# Patient Record
Sex: MALE | Race: ASIAN | NOT HISPANIC OR LATINO | Employment: FULL TIME | URBAN - METROPOLITAN AREA
[De-identification: names, ages, dates, MRNs, and addresses within clinical notes are randomized per-mention and may not be internally consistent; named-entity substitution may affect disease eponyms.]

---

## 2017-04-07 ENCOUNTER — AMBULATORY - HEALTHEAST (OUTPATIENT)
Dept: LAB | Facility: HOSPITAL | Age: 27
End: 2017-04-07

## 2017-04-07 DIAGNOSIS — R10.13 ABDOMINAL PAIN, EPIGASTRIC: ICD-10-CM

## 2017-04-07 DIAGNOSIS — R07.9 CHEST PAIN, UNSPECIFIED: ICD-10-CM

## 2023-04-07 ENCOUNTER — HOSPITAL ENCOUNTER (EMERGENCY)
Facility: HOSPITAL | Age: 33
Discharge: HOME | End: 2023-04-07
Attending: EMERGENCY MEDICINE | Admitting: EMERGENCY MEDICINE
Payer: COMMERCIAL

## 2023-04-07 ENCOUNTER — APPOINTMENT (OUTPATIENT)
Dept: CT IMAGING | Facility: HOSPITAL | Age: 33
End: 2023-04-07
Attending: EMERGENCY MEDICINE
Payer: COMMERCIAL

## 2023-04-07 VITALS
BODY MASS INDEX: 28.85 KG/M2 | TEMPERATURE: 99 F | WEIGHT: 169 LBS | HEART RATE: 56 BPM | SYSTOLIC BLOOD PRESSURE: 105 MMHG | RESPIRATION RATE: 22 BRPM | DIASTOLIC BLOOD PRESSURE: 58 MMHG | HEIGHT: 64 IN | OXYGEN SATURATION: 97 %

## 2023-04-07 DIAGNOSIS — M50.20 HERNIATED DISC, CERVICAL: ICD-10-CM

## 2023-04-07 DIAGNOSIS — M54.9 BACK PAIN: Primary | ICD-10-CM

## 2023-04-07 PROCEDURE — 99285 EMERGENCY DEPT VISIT HI MDM: CPT | Mod: 25

## 2023-04-07 PROCEDURE — 250N000013 HC RX MED GY IP 250 OP 250 PS 637: Performed by: EMERGENCY MEDICINE

## 2023-04-07 PROCEDURE — 72131 CT LUMBAR SPINE W/O DYE: CPT

## 2023-04-07 PROCEDURE — 96372 THER/PROPH/DIAG INJ SC/IM: CPT | Performed by: EMERGENCY MEDICINE

## 2023-04-07 PROCEDURE — 250N000011 HC RX IP 250 OP 636: Performed by: EMERGENCY MEDICINE

## 2023-04-07 RX ORDER — ACETAMINOPHEN 325 MG/1
975 TABLET ORAL ONCE
Status: COMPLETED | OUTPATIENT
Start: 2023-04-07 | End: 2023-04-07

## 2023-04-07 RX ORDER — IBUPROFEN 200 MG
800 TABLET ORAL EVERY 4 HOURS PRN
Qty: 20 TABLET | Refills: 0 | Status: SHIPPED | OUTPATIENT
Start: 2023-04-07

## 2023-04-07 RX ORDER — HYDROCODONE BITARTRATE AND ACETAMINOPHEN 5; 325 MG/1; MG/1
1 TABLET ORAL EVERY 6 HOURS PRN
Qty: 10 TABLET | Refills: 0 | Status: SHIPPED | OUTPATIENT
Start: 2023-04-07 | End: 2023-04-10

## 2023-04-07 RX ORDER — KETOROLAC TROMETHAMINE 30 MG/ML
30 INJECTION, SOLUTION INTRAMUSCULAR; INTRAVENOUS ONCE
Status: COMPLETED | OUTPATIENT
Start: 2023-04-07 | End: 2023-04-07

## 2023-04-07 RX ORDER — OXYCODONE HYDROCHLORIDE 5 MG/1
5 TABLET ORAL ONCE
Status: COMPLETED | OUTPATIENT
Start: 2023-04-07 | End: 2023-04-07

## 2023-04-07 RX ADMIN — ACETAMINOPHEN 975 MG: 325 TABLET ORAL at 12:54

## 2023-04-07 RX ADMIN — KETOROLAC TROMETHAMINE 30 MG: 30 INJECTION, SOLUTION INTRAMUSCULAR at 12:54

## 2023-04-07 RX ADMIN — OXYCODONE HYDROCHLORIDE 5 MG: 5 TABLET ORAL at 12:54

## 2023-04-07 NOTE — ED TRIAGE NOTES
"Pt presents with 4 days of low back pain. This morning he tried to dress his baby and suddenly had sudden onset of more pain. Pt states he is unable to walk without help. Has not taken OTC from pain. Uncle gave him \"a Medicine that helps with the spine,\" but does not know what it was called.     Triage Assessment     Row Name 04/07/23 1120       Triage Assessment (Adult)    Airway WDL WDL       Respiratory WDL    Respiratory WDL WDL       Skin Circulation/Temperature WDL    Skin Circulation/Temperature WDL WDL       Cardiac WDL    Cardiac WDL WDL       Peripheral/Neurovascular WDL    Peripheral Neurovascular WDL WDL       Cognitive/Neuro/Behavioral WDL    Cognitive/Neuro/Behavioral WDL WDL              "

## 2023-04-07 NOTE — PROGRESS NOTES
Neurosurgery Treatment Plan:   Reviewed patients chart  33 year old male with complaint of worsening back pain x 3-4 days denies any radicular lower extremity pain numbness tingling or weakness. Per provider neurologically intact with good strength in all extremities      Images:   IMPRESSION:  1.  No fracture or posttraumatic subluxation.  2.  Broad-based right central disc protrusion at L5-S1 contributing to mild to moderate right lateral recess stenosis.     Plan:   Recommend treating medically. Start steroids can send home on medrol dospak  Outpatient physical therapy   [facility]      Lilia Villar PA-C  [facility]  O: [phone]

## 2023-04-07 NOTE — DISCHARGE INSTRUCTIONS
The CAT scan showed evidence of a herniated disc.  You have been given referral to the spine clinic if symptoms persist  Vicodin and Motrin have been prescribed for pain  Symptoms may last 6 weeks

## 2023-04-07 NOTE — ED PROVIDER NOTES
EMERGENCY DEPARTMENT ENCOUNTER        IMPRESSION:  Back pain - herniated disc without radiculopathy symptoms    MEDICAL DECISION MAKING:  It was my pleasure to provide care for Joss Mann who presented for evaluation of atraumatic low back pain.  No radicular symptoms    Patient is pleasant and cooperative    On exam vital signs his vital signs are normal.  He appears uncomfortable.  There is no lumbar spinal tenderness.  Lower extremity has full function.      Symptomatic pain medications were given with significant relief    CT of the lumbar spine imaging shows central disc protrusion L for L5.       Imaging independently reviewed and agree with radiologist findings.     Case discussed with on-call neurosurgery.  Patient has a normal neurologic exam.  No need for surgical intervention.  Patient to be referred to outpatient spine clinic        =================================================================  CHIEF COMPLAINT:  Chief Complaint   Patient presents with     Back Pain       HPI  Joss Mann is a 33 year old male with a history of GERD who presents to the ED by drop-off for evaluation of back pain.    Patient reports that he was lifting weights 4 days ago when he had a sudden onset of lower back pain. He notes that the pain has progressed since, and today, he is unable to ambulate independently.    Patient has not taken any OTC pain medications.     REVIEW OF SYSTEMS   Constitutional: Does not report chills, unintentional weight loss or fatigue   Eyes: Does not report visual changes or discharge    HENT: Does not report sore throat, ear pain or neck pain  Respiratory: Does not report cough or shortness of breath    Cardiovascular: Does not report chest pain, palpitations or leg swelling  GI: Does not report abdominal pain, nausea, vomiting, or dark, bloody stools.    : Does not report hematuria, dysuria, or flank pain  Musculoskeletal: Endorses back pain (lower, decrease movement secondary to weight  "lifting)  Skin: Does not report rash or wound  Neurologic: Does not report current headache, new weakness, focal weakness, or sensory changes      Remainder of systems reviewed, unless noted in HPI all others negative.    PAST MEDICAL HISTORY:  History reviewed. No pertinent past medical history.    PAST SURGICAL HISTORY:  History reviewed. No pertinent surgical history.    CURRENT MEDICATIONS:    HYDROcodone-acetaminophen (NORCO) 5-325 MG tablet  ibuprofen (ADVIL/MOTRIN) 200 MG tablet        ALLERGIES:  No Known Allergies    FAMILY HISTORY:  No family history on file.    SOCIAL HISTORY:   Social History     Socioeconomic History     Marital status:        PHYSICAL EXAM:    /58   Pulse 56   Temp 99  F (37.2  C) (Temporal)   Resp 22   Ht 1.626 m (5' 4\")   Wt 76.7 kg (169 lb)   SpO2 97%   BMI 29.01 kg/m      Constitutional: Awake, alert, in no acute distress  Respiratory: Respirations even, unlabored. Lungs clear to ascultation bilaterally, in no acute respiratory distress.  Cardiovascular: Regular rate and rhythm.+2 radial pulses, equal bilaterally.  No murmurs.   GI: Abdomen soft, non-tender to palpation in all 4 quadrants. No guarding or rebound. Bowel sounds intact on all 4 quadrants.   Back: No CVA tenderness.    Musculoskeletal: Moves all 4 extremities equally, strength symmetrical on bilateral uppers and lowers.  No peripheral edema  Integument: Warm, dry. No rash. No bruising or petechiae.  Lymphatic: No cervical lymphadenopathy  Neurologic: Alert & oriented x 3. Normal speech. Grossly normal motor and sensory function.   Psychiatric: Normal mood and affect. Normal judgement.    ED COURSE:    12:02 PM I met with patient for initial interview and encounter. We also discussed plan for treatment and diagnostic interventions.  1:31 PM Spoke to Lilia Villar PA-C, with neurosurgery regarding patient's plan for care.    Medical Decision Making    History:    Supplemental history from: " N/A    External Record(s) reviewed: External medical records including care everywhere reviewed    Work Up:    EKG laboratory and imaging studies independently reviewed by the provider    Broad differential diagnosis considered for back pain      External consultation:    Discussion of management with another provider: Lilia Villar PA-C (neurosurgery)    Complicating factors:    Patient has a complicated past medical history including N/A    Care affected by social determinants of health: Access to primary care    Disposition considerations: Disposition involved in shared decision-making with the patient, patient instructed to continue outpatient medication as prescribed, referred for follow-up, patient discharged with pain medication             LAB:  Laboratory results were independently reviewed and interpreted  Results for orders placed or performed during the hospital encounter of 04/07/23   Lumbar spine CT w/o contrast    Impression    IMPRESSION:  1.  No fracture or posttraumatic subluxation.  2.  Broad-based right central disc protrusion at L5-S1 contributing to mild to moderate right lateral recess stenosis.         RADIOLOGY:  Radiology reports were independently reviewed and interpreted  Lumbar spine CT w/o contrast   Final Result   IMPRESSION:   1.  No fracture or posttraumatic subluxation.   2.  Broad-based right central disc protrusion at L5-S1 contributing to mild to moderate right lateral recess stenosis.           MEDICATIONS GIVEN IN THE EMERGENCY:  Medications   ketorolac (TORADOL) injection 30 mg (30 mg Intramuscular $Given 4/7/23 1254)   acetaminophen (TYLENOL) tablet 975 mg (975 mg Oral $Given 4/7/23 1254)   oxyCODONE (ROXICODONE) tablet 5 mg (5 mg Oral $Given 4/7/23 1254)       NEW PRESCRIPTIONS STARTED AT TODAY'S ER VISIT:  New Prescriptions    HYDROCODONE-ACETAMINOPHEN (NORCO) 5-325 MG TABLET    Take 1 tablet by mouth every 6 hours as needed for severe pain    IBUPROFEN (ADVIL/MOTRIN) 200  MG TABLET    Take 4 tablets (800 mg) by mouth every 4 hours as needed for mild pain        Prior to making a final disposition on this patient the results of patient's tests and other diagnostic studies were discussed with the patient. All questions were answered. Patient expressed understanding of the plan and was amenable to it.    FINAL DIAGNOSIS:    ICD-10-CM    1. Herniated disc, cervical  M50.20              NAME: Joss Mann  AGE: 33 year old male  YOB: 1990  MRN: [id number]  EVALUATION DATE & TIME: No admission date for patient encounter.    PCP: System, Provider Not In    ED PROVIDER: Reggie Storey M.D.      Brooks UNGER, am serving as a scribe to document services personally performed by Dr. Reggie Storey based on my observation and the provider's statements to me. IReggie MD attest that Brooks Hassan is acting in a scribe capacity, has observed my performance of the services and has documented them in accordance with my direction.    Reggie Storey M.D.  Emergency Medicine  [facility] EMERGENCY DEPARTMENT  [address] [place] [id number]  [phone]  Dept: [phone]  4/7/2023       Reggie Storey MD  04/07/23 2783

## 2023-11-30 ENCOUNTER — PATIENT OUTREACH (OUTPATIENT)
Dept: CARE COORDINATION | Facility: CLINIC | Age: 33
End: 2023-11-30
Payer: COMMERCIAL

## 2023-11-30 NOTE — PROGRESS NOTES
Clinic Care Coordination Contact  Program:  [facility]: [place]   Renewal: [employer]   Date Applied:     FERNANDO Outreach:   11/30/23: 1st outreach attempt. Left a message on voicemail with call back information and requested return call.  Plan: CTA will call again within 2 weeks.  Sharmin Carrillo  Care   [facility]  Clinic Care Coordination  [phone]      Health Insurance:      Referral/Screening:

## 2025-01-15 ENCOUNTER — LAB REQUISITION (OUTPATIENT)
Dept: LAB | Facility: CLINIC | Age: 35
End: 2025-01-15

## 2025-01-15 DIAGNOSIS — R10.13 EPIGASTRIC PAIN: ICD-10-CM

## 2025-01-15 PROCEDURE — 80053 COMPREHEN METABOLIC PANEL: CPT | Performed by: NURSE PRACTITIONER

## 2025-01-15 PROCEDURE — 83690 ASSAY OF LIPASE: CPT | Performed by: NURSE PRACTITIONER

## 2025-01-16 LAB
ALBUMIN SERPL BCG-MCNC: 4.8 G/DL (ref 3.5–5.2)
ALP SERPL-CCNC: 82 U/L (ref 40–150)
ALT SERPL W P-5'-P-CCNC: 41 U/L (ref 0–70)
ANION GAP SERPL CALCULATED.3IONS-SCNC: 13 MMOL/L (ref 7–15)
AST SERPL W P-5'-P-CCNC: 25 U/L (ref 0–45)
BILIRUB SERPL-MCNC: 0.4 MG/DL
BUN SERPL-MCNC: 13.3 MG/DL (ref 6–20)
CALCIUM SERPL-MCNC: 9.8 MG/DL (ref 8.8–10.4)
CHLORIDE SERPL-SCNC: 103 MMOL/L (ref 98–107)
CREAT SERPL-MCNC: 1.01 MG/DL (ref 0.67–1.17)
EGFRCR SERPLBLD CKD-EPI 2021: >90 ML/MIN/1.73M2
GLUCOSE SERPL-MCNC: 89 MG/DL (ref 70–99)
HCO3 SERPL-SCNC: 25 MMOL/L (ref 22–29)
LIPASE SERPL-CCNC: 43 U/L (ref 13–60)
POTASSIUM SERPL-SCNC: 4.4 MMOL/L (ref 3.4–5.3)
PROT SERPL-MCNC: 7.8 G/DL (ref 6.4–8.3)
SODIUM SERPL-SCNC: 141 MMOL/L (ref 135–145)